# Patient Record
Sex: FEMALE | Race: WHITE | Employment: PART TIME | URBAN - METROPOLITAN AREA
[De-identification: names, ages, dates, MRNs, and addresses within clinical notes are randomized per-mention and may not be internally consistent; named-entity substitution may affect disease eponyms.]

---

## 2021-02-03 ENCOUNTER — HOSPITAL ENCOUNTER (OUTPATIENT)
Dept: ULTRASOUND IMAGING | Facility: CLINIC | Age: 31
Discharge: HOME | End: 2021-02-03
Attending: MIDWIFE | Admitting: MIDWIFE
Payer: MEDICAID

## 2021-02-03 DIAGNOSIS — Z32.01 PREGNANCY TEST POSITIVE: ICD-10-CM

## 2021-02-03 PROCEDURE — 76801 OB US < 14 WKS SINGLE FETUS: CPT

## 2021-02-03 PROCEDURE — 76801 OB US < 14 WKS SINGLE FETUS: CPT | Mod: 26 | Performed by: RADIOLOGY

## 2021-02-03 PROCEDURE — T1013 SIGN LANG/ORAL INTERPRETER: HCPCS | Mod: GT

## 2021-02-17 ENCOUNTER — TRANSFERRED RECORDS (OUTPATIENT)
Dept: HEALTH INFORMATION MANAGEMENT | Facility: CLINIC | Age: 31
End: 2021-02-17

## 2021-02-25 ENCOUNTER — HOSPITAL ENCOUNTER (OUTPATIENT)
Facility: CLINIC | Age: 31
End: 2021-02-25
Payer: COMMERCIAL

## 2021-03-23 ENCOUNTER — APPOINTMENT (OUTPATIENT)
Dept: INTERPRETER SERVICES | Facility: CLINIC | Age: 31
End: 2021-03-23
Payer: MEDICAID

## 2021-04-06 ENCOUNTER — APPOINTMENT (OUTPATIENT)
Dept: INTERPRETER SERVICES | Facility: CLINIC | Age: 31
End: 2021-04-06
Payer: MEDICAID

## 2021-04-07 ENCOUNTER — HOSPITAL ENCOUNTER (OUTPATIENT)
Dept: ULTRASOUND IMAGING | Facility: CLINIC | Age: 31
Discharge: HOME | End: 2021-04-07
Attending: MIDWIFE | Admitting: MIDWIFE
Payer: MEDICAID

## 2021-04-07 DIAGNOSIS — Z34.82 ENCOUNTER FOR SUPERVISION OF OTHER NORMAL PREGNANCY, SECOND TRIMESTER: ICD-10-CM

## 2021-04-07 PROCEDURE — T1013 SIGN LANG/ORAL INTERPRETER: HCPCS | Mod: GT

## 2021-04-07 PROCEDURE — 76805 OB US >/= 14 WKS SNGL FETUS: CPT | Mod: 26 | Performed by: RADIOLOGY

## 2021-04-07 PROCEDURE — 76805 OB US >/= 14 WKS SNGL FETUS: CPT

## 2021-04-22 ENCOUNTER — TRANSCRIBE ORDERS (OUTPATIENT)
Dept: MATERNAL FETAL MEDICINE | Facility: CLINIC | Age: 31
End: 2021-04-22

## 2021-04-22 DIAGNOSIS — O26.90 PREGNANCY RELATED CONDITION, ANTEPARTUM: Primary | ICD-10-CM

## 2021-04-23 ENCOUNTER — APPOINTMENT (OUTPATIENT)
Dept: INTERPRETER SERVICES | Facility: CLINIC | Age: 31
End: 2021-04-23
Payer: MEDICAID

## 2021-05-03 ENCOUNTER — PRE VISIT (OUTPATIENT)
Dept: MATERNAL FETAL MEDICINE | Facility: CLINIC | Age: 31
End: 2021-05-03

## 2021-05-05 ENCOUNTER — OFFICE VISIT (OUTPATIENT)
Dept: MATERNAL FETAL MEDICINE | Facility: CLINIC | Age: 31
End: 2021-05-05
Attending: MIDWIFE
Payer: COMMERCIAL

## 2021-05-05 ENCOUNTER — HOSPITAL ENCOUNTER (OUTPATIENT)
Dept: ULTRASOUND IMAGING | Facility: CLINIC | Age: 31
End: 2021-05-05
Attending: MIDWIFE
Payer: COMMERCIAL

## 2021-05-05 ENCOUNTER — OFFICE VISIT (OUTPATIENT)
Dept: MATERNAL FETAL MEDICINE | Facility: CLINIC | Age: 31
End: 2021-05-05
Attending: OBSTETRICS & GYNECOLOGY
Payer: COMMERCIAL

## 2021-05-05 DIAGNOSIS — O35.AXX0 FETAL CLEFT LIP AND PALATE AFFECTING ANTEPARTUM CARE OF MOTHER, SINGLE OR UNSPECIFIED FETUS: Primary | ICD-10-CM

## 2021-05-05 DIAGNOSIS — O26.90 PREGNANCY RELATED CONDITION, ANTEPARTUM: ICD-10-CM

## 2021-05-05 PROCEDURE — 96040 HC GENETIC COUNSELING, EACH 30 MINUTES: CPT | Performed by: GENETIC COUNSELOR, MS

## 2021-05-05 PROCEDURE — 99204 OFFICE O/P NEW MOD 45 MIN: CPT | Mod: 25 | Performed by: OBSTETRICS & GYNECOLOGY

## 2021-05-05 PROCEDURE — 76811 OB US DETAILED SNGL FETUS: CPT | Mod: 26 | Performed by: OBSTETRICS & GYNECOLOGY

## 2021-05-05 PROCEDURE — 76811 OB US DETAILED SNGL FETUS: CPT

## 2021-05-05 NOTE — PROGRESS NOTES
"[facility] [place]  Genetic Counseling Consult    Patient: Flor Dinh YOB: 1990   Date of Service:  21      Flor Dinh was seen at the [facility] [place] for genetic consultation given abnormal ultrasound findings. A , Neida Welch, was present via telephone to assist with the appointment.       Impression/Plan:   Flor had a level II ultrasound today confirming a fetal cleft lip. Palate involvement is uncertain at this time. Dr. Ayde Sawyer met with Flor to review the ultrasound finding in detail. See Dr. Sawyer' clinic note for detail. Afterwards, Dr. Sawyer requested that I meet with Flor to discuss genetic screening and diagnostic testing options pre- and postnatally. Flor and I discussed NIPT, amniocentesis, and  genetic testing.    Much of our conversation surrounded Flor's feeling of guilt. Flor asked if she did anything to cause this such as food she ate or her tasks at her job. We discussed the multifactorial inheritance of cleft lip, and I reiterated that this occurrence was out of her control. She could neither cause it nor prevent it from happening. She also asked what she could do moving forward to keep her baby healthy. We discussed ultrasound monitoring and taking care of herself as needed as this information can be stressful and overwhelming. Flor identified her sister-in-law as a support person that she plans to share this information with.     I gave Flor my contact information as well as the  phone line and encouraged her to call me if any thoughts or questions arise in the future. I also encouraged her to write down her questions between now and her next [facility] appointment so she can get as much information as possible at each appointment. Flor stated that she values feeling informed as that will help her \"take the best care of baby\". I gave " her a written handout from the [facility] about Cleft Lip.     Pregnancy History:   /Parity:                            Age at Delivery: 30 year old  EDDIE: 2021, by Last Menstrual Period                  Gestational Age: 24w4d  -  No significant complications or exposures were reported in the current pregnancy.  -  Flor keyes pregnancy history is significant for two term pregnancies with a different partner.    Medical History:   Flor keyes reported medical history is not expected to impact pregnancy management or risks to fetal development.       Family History:   A three-generation pedigree was not obtained today due to the nature of the appointment.       Carrier Screening:       Carrier screening was beyond the scope of our discussion today.        Risk Assessment:   We explained that the risk for fetal chromosome abnormalities increases with maternal age. We discussed specific features of common chromosome abnormalities, including Down syndrome, trisomy 13, trisomy 18, and sex chromosome trisomies.      - At age 30 at delivery, the risk to have a baby with Down syndrome is 1 in 909.     - At age 30 at delivery, the risk to have a baby with any chromosome abnormality is 1 in 355.     The patient had a fetal anatomy scan on 21 that showed fetal cleft lip. Cleft lip and palate is a relatively common birth defect, occurring in approximately 1 in 1,000 live births. In the majority of cases, cleft lip and palate occurs as an isolated birth defect. However, many genetic syndromes and chromosome disorders have been identified which include cleft lip with or without palate.Clefting abnormalities can also be associated with maternal teratogen exposures.      About 70 percent of cases of CL/P and 50 percent of CP are nonsyndromic.  Both genetic and environmental factors influence the development of orofacial clefts.  Fetuses found to have orofacial clefts should undergo careful assessment for additional  structural abnormalities, as these defects are noted in 50 percent of newborns with isolated CP, 20 percent of those with CL and CP, and 8 percent for those with isolated CL.  Amniocentesis for karyotype is an available option to women with ultrasound findings of fetal orofacial clefts and associated anomalies because of the high rate of chromosomal defects. Orofacial clefts in the absence of associated congenital abnormalities are unlikely to be associated with a chromosomal abnormality; however, the difficulty in prenatal sonographic diagnosis of some of the associated malformations supports chromosomal evaluation in all fetuses with prenatally diagnosed facial clefts.    After our discussion today, Flor stated that she needs to think about all of these options. She declined NIPT and amniocentesis at this time, but expressed openness and consideration towards  genetic testing.     Testing Options:   We discussed the following options:   Non-invasive Prenatal Testing (NIPT)    Maternal plasma cell-free DNA testing; first trimester ultrasound with nuchal translucency and nasal bone assessment is recommended, when appropriate    Screens for fetal trisomy 21, trisomy 13, trisomy 18, and sex chromosome aneuploidy    Cannot screen for open neural tube defects; maternal serum AFP after 15 weeks is recommended       Genetic Amniocentesis    Invasive procedure typically performed in the second trimester by which amniotic fluid is obtained for the purpose of chromosome analysis and/or other prenatal genetic analysis    Diagnostic results; >99% sensitivity for fetal chromosome abnormalities    AFAFP measurement tests for open neural tube defects       Comprehensive (Level II) ultrasound: Detailed ultrasound performed between 18-22 weeks gestation to screen for major birth defects and markers for aneuploidy.      We reviewed the benefits and limitations of this testing.  Screening tests provide a risk assessment  specific to the pregnancy for certain fetal chromosome abnormalities, but cannot definitively diagnose or exclude a fetal chromosome abnormality.  Follow-up genetic counseling and consideration of diagnostic testing is recommended with any abnormal screening result.     Diagnostic tests carry inherent risks- including risk of miscarriage- that require careful consideration.  These tests can detect fetal chromosome abnormalities with greater than 99% certainty.  Results can be compromised by maternal cell contamination or mosaicism, and are limited by the resolution of cytogenetic G-banding technology.  There is no screening nor diagnostic test that can detect all forms of birth defects or mental disability.    It was a pleasure to be involved with [facility] care. Face-to-face time of the meeting was 60 minutes.    Priscilla Madera MS, [facility]  Genetic Counselor  Maternal Fetal Medicine  [facility]   Phone: [phone]  Pager: [phone]  Email: [email or web address]

## 2021-05-05 NOTE — PROGRESS NOTES
Flor Dinh was seen for an ultrasound today at the Maternal-Fetal Medicine center.      For the details of the ultrasound please see the report which can be found under the imaging tab.      Ayde Sawyer MD  , OB/GYN  Maternal-Fetal Medicine  [email or web address]  [phone] (Main MFM Office)  [phone] or [phone] (for 24 hour MFM questions)  [phone] (Pager)

## 2021-05-18 DIAGNOSIS — O35.AXX0 FETAL CLEFT LIP AND PALATE AFFECTING ANTEPARTUM CARE OF MOTHER, SINGLE OR UNSPECIFIED FETUS: Primary | ICD-10-CM

## 2021-05-24 ENCOUNTER — TELEPHONE (OUTPATIENT)
Dept: DENTISTRY | Facility: CLINIC | Age: 31
End: 2021-05-24

## 2021-05-26 ENCOUNTER — APPOINTMENT (OUTPATIENT)
Dept: INTERPRETER SERVICES | Facility: CLINIC | Age: 31
End: 2021-05-26
Payer: COMMERCIAL

## 2021-06-09 ENCOUNTER — TELEPHONE (OUTPATIENT)
Dept: MATERNAL FETAL MEDICINE | Facility: CLINIC | Age: 31
End: 2021-06-09

## 2021-06-09 NOTE — TELEPHONE ENCOUNTER
Writer called [place] Dr. Reynolds's office in regards to patient missing their appointments today. Writer spoke to nurse who wanted to run this by Dr. Reynolds and would return my call. Nurse at [place] called back shortly after stating that patient has been getting care at [facility] and no further action is necessary. Removing orders.    Julita COELHO .